# Patient Record
Sex: FEMALE | Race: WHITE
[De-identification: names, ages, dates, MRNs, and addresses within clinical notes are randomized per-mention and may not be internally consistent; named-entity substitution may affect disease eponyms.]

---

## 2017-05-16 ENCOUNTER — HOSPITAL ENCOUNTER (EMERGENCY)
Dept: HOSPITAL 18 - NAV ERS | Age: 42
End: 2017-05-16
Payer: COMMERCIAL

## 2017-05-16 DIAGNOSIS — Z79.899: ICD-10-CM

## 2017-05-16 DIAGNOSIS — G43.909: Primary | ICD-10-CM

## 2017-05-16 DIAGNOSIS — I10: ICD-10-CM

## 2017-05-16 PROCEDURE — 96375 TX/PRO/DX INJ NEW DRUG ADDON: CPT

## 2017-05-16 PROCEDURE — 96365 THER/PROPH/DIAG IV INF INIT: CPT

## 2017-10-17 ENCOUNTER — HOSPITAL ENCOUNTER (OUTPATIENT)
Dept: HOSPITAL 92 - MAMMO | Age: 42
Discharge: HOME | End: 2017-10-17
Attending: FAMILY MEDICINE
Payer: COMMERCIAL

## 2017-10-17 DIAGNOSIS — N64.4: Primary | ICD-10-CM

## 2017-10-17 PROCEDURE — 77066 DX MAMMO INCL CAD BI: CPT

## 2017-10-17 PROCEDURE — G0204 DX MAMMO INCL CAD BI: HCPCS

## 2017-10-17 NOTE — MMO
BILATERAL DIAGNOSTIC MAMMOGRAMS:

 

Date:  10/17/17 

 

HISTORY:  

Diagnostic study is performed because of bilateral breast tenderness. 

 

This is a baseline study. 

 

This patient's mammogram was interpreted with the assistance of computer-aided detection.  

 

FINDINGS:

There is a heterogeneously dense glandular pattern bilaterally. No focal mass or distortion. Mild pa
renchymal asymmetry in the upper mid left breast is noted. Ultrasound of this region was performed a
nd there is no sonographic abnormality seen. 

 

Recommend correlation with physical exam. If there are any areas of palpable concern, MRI could be p
erformed in this patient with dense breasts. Otherwise, recommend routine follow-up. 

 

IMPRESSION: 

 

BIRADS 2:  Benign Finding(s) 

 

POS: [facility]

## 2017-10-17 NOTE — ULT
ULTRASOUND LEFT BREAST:

 

Date:  10/17/17 

 

HISTORY:  

A directed ultrasound of the upper mid left breast was performed to assess asymmetry noted on mammog
nick. The patient also complains of bilateral breast pain. Diagnostic mammogram was performed. 

 

FINDINGS:

No sonographic abnormality is seen in the left breast. The left breast was evaluated from 11-1:00 wi
th ultrasound. 

 

IMPRESSION: 

Ultrasound findings are BIRADS Category 1 - negative. 

 

POS: [facility]

## 2017-10-23 NOTE — XMS
Clinical Summary

 Created on:2017



Patient:Keiry Hogan

Sex:Female

:1975

External Reference #:[id number]





Demographics







 Address  [address] [address] [address]

 

 Mobile Phone  [phone]

 

 Home Phone  [phone]

 

 Work Phone  [phone]

 

 Email Address  [email or web address]

 

 Preferred Language  English

 

 Marital Status  Unknown

 

 [employer] Affiliation  Unknown

 

 Race  White

 

 Ethnic Group  Not  or 









Author







 Organization  [facility]

 

 Address  [address]



   [place] [address]

 

 Phone  [phone]









Support







 Name  Relationship  Address  Phone

 

 , Willa Rodríguez  Emergency Contact  Unavailable  [phone]









Care Team Providers







 Name  Role  Phone

 

 ,  Primary Care Provider  Unavailable









Allergies







 Active Allergy  Reactions  Severity  Noted Date  Comments

 

 Meperidine (Pf)  Itching    2014  







Current Medications







 Prescription  Sig.  Disp.  Refills  Start Date  End Date  Status

 

 LISINOPRIL-HYDROCHLOROTHI  Take by mouth.          Active



 AZIDE ORAL            

 

 FLUoxetine (PROZAC) 20 MG  Take 20 mg by mouth          Active



 tablet  daily.          

 

 ALPRAZolam (XANAX) 0.25  Take 0.25 mg by          Active



 MG tablet  mouth every night          



   as needed for          



   Anxiety.          







Active Problems

No known active problems



Resolved Problems







 Problem  Noted Date  Resolved Date

 

 Adnexal mass  2014

 

 Menorrhagia  2014

 

 Dysmenorrhea  2014

 

 Dyspareunia, female  2014

 

 Cervical polyp  2014









 Overview:







 ICD9 DX Replacer







Family History







 Medical History  Relation  Name  Comments

 

 Diabetes  Brother    

 

 Heart disease  Father    

 

 Hypertension  Father    

 

 Diabetes  Mother    

 

 Heart disease  Mother    

 

 Hyperlipidemia  Mother    

 

 Stroke  Mother    









 Relation  Name  Status  Comments

 

 Brother      

 

 Father      

 

 Mother      







Social History







 Tobacco Use  Types  Packs/Day  Years Used  Date

 

 Current Every Day Smoker    0.5  10  









 Smokeless Tobacco: Never Used      









 Tobacco Cessation:Ready to Quit: No; Counseling Given: Yes









 Alcohol Use  Drinks/Week  oz/Week  Comments

 

 Yes  5 Cans of beer  3.0  socially









 Sex Assigned at Birth  Date Recorded

 

 Not on file  







Last Filed Vital Signs







 Vital Sign  Reading  Time Taken

 

 Blood Pressure  122/80  2014  9:39 AM CDT

 

 Pulse  81  2014 11:14 AM CDT

 

 Temperature  36.9 C (98.4 F)  2014 11:14 AM CDT

 

 Respiratory Rate  18  2014 11:14 AM CDT

 

 Oxygen Saturation  100%  2014 11:14 AM CDT

 

 Inhaled Oxygen Concentration  -  -

 

 Weight  81.6 kg (180 lb)  2014  9:39 AM CDT

 

 Height  172.7 cm (5' 8")  2014  9:39 AM CDT

 

 Body Mass Index  27.37  2014  9:39 AM CDT







Plan of Treatment







 Health Maintenance  Due Date  Last Done  Comments

 

 INFLUENZA VACCINE  10/01/2017    







Results

Not on filefrom Last 3 Months

## 2019-04-23 ENCOUNTER — HOSPITAL ENCOUNTER (EMERGENCY)
Dept: HOSPITAL 18 - NAV ERS | Age: 44
Discharge: HOME | End: 2019-04-23
Payer: COMMERCIAL

## 2019-04-23 DIAGNOSIS — G43.909: Primary | ICD-10-CM

## 2019-04-23 DIAGNOSIS — Z79.899: ICD-10-CM

## 2019-04-23 DIAGNOSIS — F41.9: ICD-10-CM

## 2019-04-23 DIAGNOSIS — I10: ICD-10-CM

## 2019-04-23 DIAGNOSIS — F17.210: ICD-10-CM

## 2019-04-23 PROCEDURE — 96372 THER/PROPH/DIAG INJ SC/IM: CPT

## 2019-08-21 ENCOUNTER — HOSPITAL ENCOUNTER (OUTPATIENT)
Dept: HOSPITAL 92 - SCSRAD | Age: 44
Discharge: HOME | End: 2019-08-21
Attending: FAMILY MEDICINE
Payer: COMMERCIAL

## 2019-08-21 DIAGNOSIS — R19.7: ICD-10-CM

## 2019-08-21 DIAGNOSIS — R05: Primary | ICD-10-CM

## 2019-08-21 DIAGNOSIS — J02.9: ICD-10-CM

## 2019-08-21 PROCEDURE — 71046 X-RAY EXAM CHEST 2 VIEWS: CPT

## 2019-08-21 NOTE — RAD
2 VIEWS CHEST:

 

Date:  08/21/19 

 

COMPARISON:  

10/14/13. 

 

HISTORY:  

Cough. 

 

FINDINGS:

There is no pneumothorax or pleural fluid. No focal consolidation or alveolar edema. Heart and medias
tinal contours are unremarkable. 

 

IMPRESSION: 

No acute findings.  

 

 

POS: [facility]